# Patient Record
Sex: MALE | Race: WHITE | Employment: OTHER | URBAN - METROPOLITAN AREA
[De-identification: names, ages, dates, MRNs, and addresses within clinical notes are randomized per-mention and may not be internally consistent; named-entity substitution may affect disease eponyms.]

---

## 2016-09-02 LAB
ANION GAP SERPL CALCULATED.3IONS-SCNC: NORMAL MMOL/L
BUN SERPL-MCNC: 15 MG/DL
CALCIUM SERPL-MCNC: 9.9 MG/DL
CHLORIDE SERPLBLD-SCNC: 100 MMOL/L
CHOLEST SERPL-MCNC: 206 MG/DL
CO2 SERPL-SCNC: 24 MMOL/L
CREAT SERPL-MCNC: NORMAL MG/DL
GFR SERPL CREATININE-BSD FRML MDRD: >60 ML/MIN/1.73M2
GLUCOSE SERPL-MCNC: 86 MG/DL (ref 70–99)
HDLC SERPL-MCNC: 72 MG/DL
LDLC SERPL CALC-MCNC: 120 MG/DL
POTASSIUM SERPL-SCNC: 4 MMOL/L
SODIUM SERPL-SCNC: 135 MMOL/L
TRIGL SERPL-MCNC: 68 MG/DL

## 2017-03-09 DIAGNOSIS — I10 ESSENTIAL HYPERTENSION, BENIGN: ICD-10-CM

## 2017-03-09 RX ORDER — LOSARTAN POTASSIUM AND HYDROCHLOROTHIAZIDE 12.5; 1 MG/1; MG/1
1 TABLET ORAL DAILY
Qty: 90 TABLET | Refills: 2 | Status: SHIPPED | OUTPATIENT
Start: 2017-03-09 | End: 2018-02-27

## 2017-03-30 ENCOUNTER — DOCUMENTATION ONLY (OUTPATIENT)
Dept: CARDIOLOGY | Facility: CLINIC | Age: 67
End: 2017-03-30

## 2017-03-30 NOTE — LETTER
March 30, 2017       TO: Thomas Desir  [address]  [facility] [address]       Dear Thomas Desir,    We are reviewing our outstanding orders.    Dr. Shah has ordered labs and an office visit for follow up.      Please contact the scheduling desk at [phone] to arranged for an appointment.     If you have any questions, please call the Team 4 nurse phone @ [phone]. If you had your lab work done at another facility, please give us a call so we can locate the results.     Thank you  Team 4 nurses

## 2017-08-01 DIAGNOSIS — I10 HTN (HYPERTENSION): Primary | ICD-10-CM

## 2017-12-11 ENCOUNTER — TELEPHONE (OUTPATIENT)
Dept: CARDIOLOGY | Facility: CLINIC | Age: 67
End: 2017-12-11

## 2017-12-11 NOTE — TELEPHONE ENCOUNTER
Overdue for f/u OV with Dr. Garcia along with fasting Lipid profile. Left message for patient to call for appointment.

## 2018-02-08 ENCOUNTER — OFFICE VISIT (OUTPATIENT)
Dept: CARDIOLOGY | Facility: CLINIC | Age: 68
End: 2018-02-08
Payer: COMMERCIAL

## 2018-02-08 VITALS
HEART RATE: 60 BPM | BODY MASS INDEX: 25.93 KG/M2 | WEIGHT: 202 LBS | SYSTOLIC BLOOD PRESSURE: 132 MMHG | DIASTOLIC BLOOD PRESSURE: 80 MMHG | HEIGHT: 74 IN

## 2018-02-08 DIAGNOSIS — E78.2 MIXED HYPERLIPIDEMIA: ICD-10-CM

## 2018-02-08 DIAGNOSIS — I10 BENIGN ESSENTIAL HYPERTENSION: ICD-10-CM

## 2018-02-08 DIAGNOSIS — I25.10 CORONARY ARTERY DISEASE INVOLVING NATIVE CORONARY ARTERY OF NATIVE HEART WITHOUT ANGINA PECTORIS: ICD-10-CM

## 2018-02-08 LAB
CHOLEST SERPL-MCNC: 170 MG/DL
HDLC SERPL-MCNC: 67 MG/DL
LDLC SERPL CALC-MCNC: 82 MG/DL
NONHDLC SERPL-MCNC: 103 MG/DL
TRIGL SERPL-MCNC: 107 MG/DL

## 2018-02-08 PROCEDURE — 36415 COLL VENOUS BLD VENIPUNCTURE: CPT | Performed by: INTERNAL MEDICINE

## 2018-02-08 PROCEDURE — 80061 LIPID PANEL: CPT | Performed by: INTERNAL MEDICINE

## 2018-02-08 PROCEDURE — 99214 OFFICE O/P EST MOD 30 MIN: CPT | Performed by: INTERNAL MEDICINE

## 2018-02-08 NOTE — MR AVS SNAPSHOT
"              After Visit Summary   2/8/2018    Thomas Desir    MRN: [id number]           Patient Information     Date Of Birth          1950        Visit Information        Provider Department      2/8/2018 10:15 AM John Shah MD [facility]        Today's Diagnoses     Coronary artery disease involving native coronary artery of native heart without angina pectoris        Benign essential hypertension        Mixed hyperlipidemia           Follow-ups after your visit        Additional Services     Follow-Up with Cardiologist                 Future tests that were ordered for you today     Open Future Orders        Priority Expected Expires Ordered    Follow-Up with Cardiologist Routine 2/8/2019 2/9/2019 2/8/2018            Who to contact     If you have questions or need follow up information about today's clinic visit or your schedule please contact [facility] directly at [phone].  Normal or non-critical lab and imaging results will be communicated to you by [employer], letter or phone within 4 business days after the clinic has received the results. If you do not hear from us within 7 days, please contact the clinic through [employer] or phone. If you have a critical or abnormal lab result, we will notify you by phone as soon as possible.  Submit refill requests through [email or web address] or call your pharmacy and they will forward the refill request to us. Please allow 3 business days for your refill to be completed.          Additional Information About Your Visit        [employer] Information     [email or web address] lets you send messages to your doctor, view your test results, renew your prescriptions, schedule appointments and more. To sign up, go to [email or web address] . Click on \"Log in\" on the left side of the screen, which will take you to the Welcome page. Then click on \"Sign up Now\" on the right side of the page.     You will be " "asked to enter the access code listed below, as well as some personal information. Please follow the directions to create your username and password.     Your access code is: [email or web address]  Expires: 2018 10:51 AM     Your access code will  in 90 days. If you need help or a new code, please call your [place] clinic or [phone].        Care EveryWhere ID     This is your Care EveryWhere ID. This could be used by other organizations to access your [place] medical records  [id number]        Your Vitals Were     Pulse Height BMI (Body Mass Index)             60 1.88 m (6' 2\") 25.94 kg/m2          Blood Pressure from Last 3 Encounters:   18 132/80   16 122/70   09/03/15 130/78    Weight from Last 3 Encounters:   18 91.6 kg (202 lb)   16 89.8 kg (198 lb)   09/03/15 90.3 kg (199 lb)              We Performed the Following     Follow-Up with Cardiologist        Primary Care Provider Office Phone # Fax #    Alex Abraham [phone]       [facility] [address] [place] [address]        Equal Access to Services     PINKY REILLY : Hadii aad ku hadasho Soomaali, waaxda luqadaha, qaybta kaalmada adeegyada, waxay idiin hayangela petersen . So [facility] [phone].    ATENCIÓN: Si habla español, tiene a pittman disposición servicios gratuitos de asistencia lingüística. Llame al [phone].    We comply with applicable federal civil rights laws and [place] laws. We do not discriminate on the basis of race, color, national origin, age, disability, sex, sexual orientation, or gender identity.            Thank you!     Thank you for choosing [facility]  for your care. Our goal is always to provide you with excellent care. Hearing back from our patients is one way we can continue to improve our services. Please take a few minutes to complete the written survey that you may receive in the mail after your " visit with us. Thank you!             Your Updated Medication List - Protect others around you: Learn how to safely use, store and throw away your medicines at www.disposemymeds.org.          This list is accurate as of 2/8/18 10:51 AM.  Always use your most recent med list.                   Brand Name Dispense Instructions for use Diagnosis    aspirin 81 MG tablet      Take 162 mg by mouth daily        losartan-hydrochlorothiazide 100-12.5 MG per tablet    HYZAAR    90 tablet    Take 1 tablet by mouth daily    Essential hypertension, benign       rosuvastatin 40 MG tablet    CRESTOR    90 tablet    Take 1 tablet (40 mg) by mouth daily    Mixed hyperlipidemia

## 2018-02-08 NOTE — PROGRESS NOTES
HPI and Plan:   See dictation:[id number]    Orders Placed This Encounter   Procedures     Follow-Up with Cardiologist       No orders of the defined types were placed in this encounter.      There are no discontinued medications.      Encounter Diagnoses   Name Primary?     Coronary artery disease involving native coronary artery of native heart without angina pectoris      Benign essential hypertension      Mixed hyperlipidemia        CURRENT MEDICATIONS:  Current Outpatient Prescriptions   Medication Sig Dispense Refill     losartan-hydrochlorothiazide (HYZAAR) 100-12.5 MG per tablet Take 1 tablet by mouth daily 90 tablet 2     rosuvastatin (CRESTOR) 40 MG tablet Take 1 tablet (40 mg) by mouth daily 90 tablet 3     aspirin 81 MG tablet Take 162 mg by mouth daily         ALLERGIES   No Known Allergies    PAST MEDICAL HISTORY:  Past Medical History:   Diagnosis Date     CAD (coronary artery disease) 8/31/2015     Duodenal ulcer     age 20     ED (erectile dysfunction)      HTN (hypertension) 8/31/2015     Mixed hyperlipidemia 8/31/2015       PAST SURGICAL HISTORY:  Past Surgical History:   Procedure Laterality Date     HC LEFT HEART CATHETERIZATION  1995    PTCA to LAD 95% to residual of 40% lesion. No other significant disease.       FAMILY HISTORY:  Family History   Problem Relation Age of Onset     Coronary Artery Disease Father      Lymphoma Mother        SOCIAL HISTORY:  Social History     Social History     Marital status:      Spouse name: N/A     Number of children: N/A     Years of education: N/A     Social History Main Topics     Smoking status: Former Smoker     Smokeless tobacco: Former User      Comment: quit 1992     Alcohol use 0.0 oz/week     0 Standard drinks or equivalent per week      Comment: 2 drinks every evening     Drug use: No     Sexual activity: Not Asked     Other Topics Concern     Caffeine Concern No     3 cups a day     Exercise Yes     walking 3 x week, approx 1 mile     Seat Belt  "Yes     Social History Narrative       Review of Systems:  Skin:  Negative       Eyes:  Positive for glasses    ENT:  Negative      Respiratory:  Negative       Cardiovascular:  Negative      Gastroenterology: Negative      Genitourinary:  not assessed      Musculoskeletal:  Negative      Neurologic:  Negative      Psychiatric:  Negative      Heme/Lymph/Imm:  Negative      Endocrine:  Negative        Physical Exam:  Vitals: /80 (BP Location: Right arm, Patient Position: Sitting, Cuff Size: Adult Large)  Pulse 60  Ht 1.88 m (6' 2\")  Wt 91.6 kg (202 lb)  BMI 25.94 kg/m2    Constitutional:  cooperative, alert and oriented, well developed, well nourished, in no acute distress        Skin:  warm and dry to the touch, no apparent skin lesions or masses noted          Head:  normocephalic, no masses or lesions        Eyes:  pupils equal and round, conjunctivae and lids unremarkable, sclera white, no xanthalasma, EOMS intact, no nystagmus        Lymph:      ENT:  no pallor or cyanosis, dentition good        Neck:  carotid pulses are full and equal bilaterally, JVP normal, no carotid bruit        Respiratory:  normal breath sounds, clear to auscultation, normal A-P diameter, normal symmetry, normal respiratory excursion, no use of accessory muscles         Cardiac: regular rhythm, normal S1/S2, no S3 or S4, apical impulse not displaced, no murmurs, gallops or rubs                pulses full and equal, no bruits auscultated                                        GI:  abdomen soft, non-tender, BS normoactive, no mass, no HSM, no bruits        Extremities and Muscular Skeletal:  no deformities, clubbing, cyanosis, erythema observed;no edema              Neurological:  no gross motor deficits;affect appropriate        Psych:  Alert and Oriented x 3        CC  John Shah MD  [address]  [place] [address]              "

## 2018-02-08 NOTE — PROGRESS NOTES
Service Date: 02/08/2018      PRIMARY CARE PHYSICIAN:  Dr. Alex Abraham.      HISTORY OF PRESENT ILLNESS:  I again had the pleasure of seeing your patient, Thomas Desir, at [facility] for evaluation of coronary artery disease, hypertension and hyperlipidemia.  The patient is status post left anterior descending artery angioplasty in 1995.  He denies recurrent angina.  A stress echocardiogram 09/20/2013 was normal without evidence of cardiac ischemia.  The patient denies myalgias or myopathy.  He denies palpitations, syncope, presyncope, PND, orthopnea or peripheral edema.  He has known hyperlipidemia and elevated lipid protein (a).  A fasting lipid profile today includes total cholesterol 170, HDL 67, LDL 82 and triglycerides 107.  Of note, his LDL cholesterol in 09/2016 was 120 but he had run out of his statin shortly before that test.  He is exercising by walking only a half mile per day with his wife.  He is going to semi-retire in June of this year.  He has a [employer] business as well.  He remains on a relatively low fat diet.      PHYSICAL EXAMINATION:  As listed below.      ASSESSMENT:   1.  Thomas Desir is a delightful 67-year-old male with a history of coronary artery disease.  He is stable without recurrent angina.  We discussed repeating his stress echocardiogram this year and he prefers to wait and discuss this with me next year.  He remains symptom-free.   2.  Hyperlipidemia with high Lp(a).  His lipids are reasonably well controlled.  His LDL is still above 70 but his HDL is also quite high.  His LDL will come down further with attention to exercise.  We discussed this at length.      It is my pleasure to assist in the care of Thomas Desir.  I will see him again in 1 year with a fasting lipid profile.  A BMP will be performed with his primary care physician annually.  I reviewed this from 09/2016 with normal BUN and creatinine.  All the patient's questions today were  answered to his satisfaction.      Shirley Shah MD       cc:   Alex Abraham MD    [facility]    [address] [place]  [address]         SHIRLEY SHAH MD, [facility]             D: 2018   T: 2018   MT: MILO      Name:     DAVIE ZEE   MRN:      [id number]        Account:      [id number]   :      1950           Service Date: 2018      Document: [id number]

## 2018-02-08 NOTE — LETTER
2/8/2018    Alex Abraham MD  [facility] [address] [place] [address]    RE: Thomas Desir       Dear Colleague,    I had the pleasure of seeing Thomas Desir in the [facility].    HPI and Plan:   See dictation:[id number]    Orders Placed This Encounter   Procedures     Follow-Up with Cardiologist       No orders of the defined types were placed in this encounter.      There are no discontinued medications.      Encounter Diagnoses   Name Primary?     Coronary artery disease involving native coronary artery of native heart without angina pectoris      Benign essential hypertension      Mixed hyperlipidemia        CURRENT MEDICATIONS:  Current Outpatient Prescriptions   Medication Sig Dispense Refill     losartan-hydrochlorothiazide (HYZAAR) 100-12.5 MG per tablet Take 1 tablet by mouth daily 90 tablet 2     rosuvastatin (CRESTOR) 40 MG tablet Take 1 tablet (40 mg) by mouth daily 90 tablet 3     aspirin 81 MG tablet Take 162 mg by mouth daily         ALLERGIES   No Known Allergies    PAST MEDICAL HISTORY:  Past Medical History:   Diagnosis Date     CAD (coronary artery disease) 8/31/2015     Duodenal ulcer     age 20     ED (erectile dysfunction)      HTN (hypertension) 8/31/2015     Mixed hyperlipidemia 8/31/2015       PAST SURGICAL HISTORY:  Past Surgical History:   Procedure Laterality Date     HC LEFT HEART CATHETERIZATION  1995    PTCA to LAD 95% to residual of 40% lesion. No other significant disease.       FAMILY HISTORY:  Family History   Problem Relation Age of Onset     Coronary Artery Disease Father      Lymphoma Mother        SOCIAL HISTORY:  Social History     Social History     Marital status:      Spouse name: N/A     Number of children: N/A     Years of education: N/A     Social History Main Topics     Smoking status: Former Smoker     Smokeless tobacco: Former User      Comment: quit 1992     Alcohol use 0.0 oz/week     0  "Standard drinks or equivalent per week      Comment: 2 drinks every evening     Drug use: No     Sexual activity: Not Asked     Other Topics Concern     Caffeine Concern No     3 cups a day     Exercise Yes     walking 3 x week, approx 1 mile     Seat Belt Yes     Social History Narrative       Review of Systems:  Skin:  Negative       Eyes:  Positive for glasses    ENT:  Negative      Respiratory:  Negative       Cardiovascular:  Negative      Gastroenterology: Negative      Genitourinary:  not assessed      Musculoskeletal:  Negative      Neurologic:  Negative      Psychiatric:  Negative      Heme/Lymph/Imm:  Negative      Endocrine:  Negative        Physical Exam:  Vitals: /80 (BP Location: Right arm, Patient Position: Sitting, Cuff Size: Adult Large)  Pulse 60  Ht 1.88 m (6' 2\")  Wt 91.6 kg (202 lb)  BMI 25.94 kg/m2    Constitutional:  cooperative, alert and oriented, well developed, well nourished, in no acute distress        Skin:  warm and dry to the touch, no apparent skin lesions or masses noted          Head:  normocephalic, no masses or lesions        Eyes:  pupils equal and round, conjunctivae and lids unremarkable, sclera white, no xanthalasma, EOMS intact, no nystagmus        Lymph:      ENT:  no pallor or cyanosis, dentition good        Neck:  carotid pulses are full and equal bilaterally, JVP normal, no carotid bruit        Respiratory:  normal breath sounds, clear to auscultation, normal A-P diameter, normal symmetry, normal respiratory excursion, no use of accessory muscles         Cardiac: regular rhythm, normal S1/S2, no S3 or S4, apical impulse not displaced, no murmurs, gallops or rubs                pulses full and equal, no bruits auscultated                                        GI:  abdomen soft, non-tender, BS normoactive, no mass, no HSM, no bruits        Extremities and Muscular Skeletal:  no deformities, clubbing, cyanosis, erythema observed;no edema          "     Neurological:  no gross motor deficits;affect appropriate        Psych:  Alert and Oriented x 3        CC  John Shah MD  [address]  [place] [address]                Thank you for allowing me to participate in the care of your patient.      Sincerely,     John Shah MD     [facility]    cc:   John Shah MD  [address]  [place] [address]

## 2018-02-08 NOTE — LETTER
2/8/2018      Alex Abraham MD  [facility] [address] [place] [address]      RE: Thomas Desir       Dear Colleague,    I had the pleasure of seeing Thomas Desir in the [facility].    Service Date: 02/08/2018      PRIMARY CARE PHYSICIAN:  Dr. Alex Abraham.      HISTORY OF PRESENT ILLNESS:  I again had the pleasure of seeing your patient, Thomas Desir, at [facility] for evaluation of coronary artery disease, hypertension and hyperlipidemia.  The patient is status post left anterior descending artery angioplasty in 1995.  He denies recurrent angina.  A stress echocardiogram 09/20/2013 was normal without evidence of cardiac ischemia.  The patient denies myalgias or myopathy.  He denies palpitations, syncope, presyncope, PND, orthopnea or peripheral edema.  He has known hyperlipidemia and elevated lipid protein (a).  A fasting lipid profile today includes total cholesterol 170, HDL 67, LDL 82 and triglycerides 107.  Of note, his LDL cholesterol in 09/2016 was 120 but he had run out of his statin shortly before that test.  He is exercising by walking only a half mile per day with his wife.  He is going to semi-retire in June of this year.  He has a [employer] business as well.  He remains on a relatively low fat diet.      PHYSICAL EXAMINATION:  As listed below.      ASSESSMENT:   1.  Thomas Desir is a delightful 67-year-old male with a history of coronary artery disease.  He is stable without recurrent angina.  We discussed repeating his stress echocardiogram this year and he prefers to wait and discuss this with me next year.  He remains symptom-free.   2.  Hyperlipidemia with high Lp(a).  His lipids are reasonably well controlled.  His LDL is still above 70 but his HDL is also quite high.  His LDL will come down further with attention to exercise.  We discussed this at length.      It is my pleasure to assist in the care  of Davie Desir.  I will see him again in 1 year with a fasting lipid profile.  A BMP will be performed with his primary care physician annually.  I reviewed this from 2016 with normal BUN and creatinine.  All the patient's questions today were answered to his satisfaction.      Shirley Hein MD       cc:   Alex Abraham MD    [facility]    [address] [place]  [address]         SHIRLEY HEIN MD, [facility]             D: 2018   T: 2018   MT: MILO      Name:     DAVIE DESIR   MRN:      [id number]        Account:      [id number]   :      1950           Service Date: 2018      Document: [id number]           Outpatient Encounter Prescriptions as of 2018   Medication Sig Dispense Refill     losartan-hydrochlorothiazide (HYZAAR) 100-12.5 MG per tablet Take 1 tablet by mouth daily 90 tablet 2     rosuvastatin (CRESTOR) 40 MG tablet Take 1 tablet (40 mg) by mouth daily 90 tablet 3     aspirin 81 MG tablet Take 162 mg by mouth daily       No facility-administered encounter medications on file as of 2018.        Again, thank you for allowing me to participate in the care of your patient.      Sincerely,    Shirley Hein MD     [facility]

## 2018-02-16 DIAGNOSIS — E78.2 MIXED HYPERLIPIDEMIA: ICD-10-CM

## 2018-02-16 DIAGNOSIS — I10 ESSENTIAL HYPERTENSION, BENIGN: ICD-10-CM

## 2018-02-16 RX ORDER — ROSUVASTATIN CALCIUM 40 MG/1
40 TABLET, COATED ORAL DAILY
Qty: 90 TABLET | Refills: 3 | Status: SHIPPED | OUTPATIENT
Start: 2018-02-16 | End: 2019-06-17

## 2018-02-27 RX ORDER — LOSARTAN POTASSIUM AND HYDROCHLOROTHIAZIDE 12.5; 1 MG/1; MG/1
1 TABLET ORAL DAILY
Qty: 90 TABLET | Refills: 3 | Status: SHIPPED | OUTPATIENT
Start: 2018-02-27 | End: 2019-05-30

## 2019-04-25 LAB
ANION GAP SERPL CALCULATED.3IONS-SCNC: NORMAL MMOL/L
BUN SERPL-MCNC: 13 MG/DL
CALCIUM SERPL-MCNC: 9.5 MG/DL
CHLORIDE SERPLBLD-SCNC: 107 MMOL/L
CHOLEST SERPL-MCNC: 187 MG/DL
CO2 SERPL-SCNC: 24 MMOL/L
CREAT SERPL-MCNC: 0.78 MG/DL
GFR SERPL CREATININE-BSD FRML MDRD: >60 ML/MIN/1.73M2
GLUCOSE SERPL-MCNC: 94 MG/DL (ref 70–100)
HDLC SERPL-MCNC: 66 MG/DL
LDLC SERPL CALC-MCNC: 107 MG/DL
NONHDLC SERPL-MCNC: NORMAL MG/DL
POTASSIUM SERPL-SCNC: 4.2 MMOL/L
SODIUM SERPL-SCNC: 140 MMOL/L
TRIGL SERPL-MCNC: 70 MG/DL

## 2019-05-30 DIAGNOSIS — I10 ESSENTIAL HYPERTENSION, BENIGN: ICD-10-CM

## 2019-05-30 RX ORDER — LOSARTAN POTASSIUM AND HYDROCHLOROTHIAZIDE 12.5; 1 MG/1; MG/1
1 TABLET ORAL DAILY
Qty: 90 TABLET | Refills: 0 | Status: SHIPPED | OUTPATIENT
Start: 2019-05-30 | End: 2019-08-20

## 2019-06-17 DIAGNOSIS — E78.2 MIXED HYPERLIPIDEMIA: ICD-10-CM

## 2019-06-17 RX ORDER — ROSUVASTATIN CALCIUM 40 MG/1
40 TABLET, COATED ORAL DAILY
Qty: 90 TABLET | Refills: 0 | Status: SHIPPED | OUTPATIENT
Start: 2019-06-17

## 2019-08-20 DIAGNOSIS — I10 ESSENTIAL HYPERTENSION, BENIGN: ICD-10-CM

## 2019-08-20 RX ORDER — LOSARTAN POTASSIUM AND HYDROCHLOROTHIAZIDE 12.5; 1 MG/1; MG/1
1 TABLET ORAL DAILY
Qty: 90 TABLET | Refills: 0 | Status: SHIPPED | OUTPATIENT
Start: 2019-08-20 | End: 2019-12-27

## 2019-08-20 NOTE — TELEPHONE ENCOUNTER
Refill request received for losartan-hydrochlorothiazide. Pt overdue for follow up with Dr. Shah. Tried to call patient to get him scheduled for OV. No answer. Left VM informing patient to call scheduling to schedule OV with Dr. Shah. Scheduling number left for patient. 90 day supply sent in to [facility] with instructions to call clinic for OV.

## 2019-12-27 DIAGNOSIS — I10 ESSENTIAL HYPERTENSION, BENIGN: ICD-10-CM

## 2019-12-27 RX ORDER — LOSARTAN POTASSIUM AND HYDROCHLOROTHIAZIDE 12.5; 1 MG/1; MG/1
1 TABLET ORAL DAILY
Qty: 90 TABLET | Refills: 0 | Status: SHIPPED | OUTPATIENT
Start: 2019-12-27 | End: 2020-03-23

## 2020-03-23 DIAGNOSIS — I10 ESSENTIAL HYPERTENSION, BENIGN: ICD-10-CM

## 2020-03-23 RX ORDER — LOSARTAN POTASSIUM AND HYDROCHLOROTHIAZIDE 12.5; 1 MG/1; MG/1
1 TABLET ORAL DAILY
Qty: 90 TABLET | Refills: 0 | Status: SHIPPED | OUTPATIENT
Start: 2020-03-23 | End: 2020-12-15

## 2020-12-01 ENCOUNTER — TELEPHONE (OUTPATIENT)
Dept: CARDIOLOGY | Facility: CLINIC | Age: 70
End: 2020-12-01

## 2020-12-01 NOTE — TELEPHONE ENCOUNTER
Fax sent to [facility] to deny refill. Patient no longer following with our clinic.

## 2020-12-15 DIAGNOSIS — I10 ESSENTIAL HYPERTENSION, BENIGN: ICD-10-CM

## 2020-12-15 RX ORDER — LOSARTAN POTASSIUM AND HYDROCHLOROTHIAZIDE 12.5; 1 MG/1; MG/1
1 TABLET ORAL DAILY
Qty: 90 TABLET | Refills: 0 | Status: SHIPPED | OUTPATIENT
Start: 2020-12-15 | End: 2021-03-29

## 2021-01-14 ENCOUNTER — PRE VISIT (OUTPATIENT)
Dept: CARDIOLOGY | Facility: CLINIC | Age: 71
End: 2021-01-14

## 2021-01-14 DIAGNOSIS — I10 HTN (HYPERTENSION): ICD-10-CM

## 2021-01-14 DIAGNOSIS — E78.2 MIXED HYPERLIPIDEMIA: Primary | ICD-10-CM

## 2021-01-14 NOTE — TELEPHONE ENCOUNTER
Faxed request for ekg strip 4/6/2020 from [facility].      1/15/2021 attempted to contact patient to discuss lab appointment for OV 2/2/21. Left message for patient to call back.

## 2021-01-14 NOTE — TELEPHONE ENCOUNTER
Patient called, message left that Labs would like to be drawn prior to Dr. Castro's visit 2-2-21.   Orders entered and message left to please call back.     PCP @ [facility]. Appears last labs were   2019.   EKG tracing from April 2020 to be requested.

## 2021-01-28 DIAGNOSIS — I10 HTN (HYPERTENSION): ICD-10-CM

## 2021-01-28 DIAGNOSIS — E78.2 MIXED HYPERLIPIDEMIA: ICD-10-CM

## 2021-01-28 LAB
ALT SERPL W P-5'-P-CCNC: 30 U/L (ref 0–70)
ANION GAP SERPL CALCULATED.3IONS-SCNC: 5 MMOL/L (ref 3–14)
BUN SERPL-MCNC: 11 MG/DL (ref 7–30)
CALCIUM SERPL-MCNC: 9.2 MG/DL (ref 8.5–10.1)
CHLORIDE SERPL-SCNC: 108 MMOL/L (ref 94–109)
CHOLEST SERPL-MCNC: 147 MG/DL
CO2 SERPL-SCNC: 27 MMOL/L (ref 20–32)
CREAT SERPL-MCNC: 0.8 MG/DL (ref 0.66–1.25)
GFR SERPL CREATININE-BSD FRML MDRD: >90 ML/MIN/{1.73_M2}
GLUCOSE SERPL-MCNC: 91 MG/DL (ref 70–99)
HDLC SERPL-MCNC: 74 MG/DL
LDLC SERPL CALC-MCNC: 62 MG/DL
NONHDLC SERPL-MCNC: 73 MG/DL
POTASSIUM SERPL-SCNC: 4.5 MMOL/L (ref 3.4–5.3)
SODIUM SERPL-SCNC: 140 MMOL/L (ref 133–144)
TRIGL SERPL-MCNC: 56 MG/DL

## 2021-01-28 PROCEDURE — 36415 COLL VENOUS BLD VENIPUNCTURE: CPT | Performed by: INTERNAL MEDICINE

## 2021-01-28 PROCEDURE — 84460 ALANINE AMINO (ALT) (SGPT): CPT | Performed by: INTERNAL MEDICINE

## 2021-01-28 PROCEDURE — 80061 LIPID PANEL: CPT | Performed by: INTERNAL MEDICINE

## 2021-01-28 PROCEDURE — 80048 BASIC METABOLIC PNL TOTAL CA: CPT | Performed by: INTERNAL MEDICINE

## 2021-02-02 ENCOUNTER — OFFICE VISIT (OUTPATIENT)
Dept: CARDIOLOGY | Facility: CLINIC | Age: 71
End: 2021-02-02
Payer: COMMERCIAL

## 2021-02-02 VITALS
SYSTOLIC BLOOD PRESSURE: 122 MMHG | HEIGHT: 74 IN | DIASTOLIC BLOOD PRESSURE: 70 MMHG | WEIGHT: 194.2 LBS | BODY MASS INDEX: 24.92 KG/M2 | HEART RATE: 76 BPM | OXYGEN SATURATION: 96 %

## 2021-02-02 DIAGNOSIS — Z15.89 BIALLELIC MUTATION OF LPA GENE: ICD-10-CM

## 2021-02-02 DIAGNOSIS — I25.10 CORONARY ARTERY DISEASE INVOLVING NATIVE CORONARY ARTERY OF NATIVE HEART WITHOUT ANGINA PECTORIS: Primary | ICD-10-CM

## 2021-02-02 DIAGNOSIS — I10 BENIGN ESSENTIAL HYPERTENSION: ICD-10-CM

## 2021-02-02 DIAGNOSIS — E78.2 MIXED HYPERLIPIDEMIA: ICD-10-CM

## 2021-02-02 PROCEDURE — 99214 OFFICE O/P EST MOD 30 MIN: CPT | Performed by: INTERNAL MEDICINE

## 2021-02-02 ASSESSMENT — MIFFLIN-ST. JEOR: SCORE: 1710.64

## 2021-02-02 NOTE — LETTER
2/2/2021    Alex Abraham MD  [facility] [address] [place] [address]    RE: Thomas Desir       Dear Colleague,    I had the pleasure of seeing Thomas Desir in the [facility].    HPI and Plan:   See dictation    Orders Placed This Encounter   Procedures     Basic metabolic panel     Lipid Profile     Follow-Up with Cardiologist       No orders of the defined types were placed in this encounter.      There are no discontinued medications.      Encounter Diagnoses   Name Primary?     Coronary artery disease involving native coronary artery of native heart without angina pectoris Yes     Benign essential hypertension      Mixed hyperlipidemia      Biallelic mutation of LPA gene        CURRENT MEDICATIONS:  Current Outpatient Medications   Medication Sig Dispense Refill     aspirin 81 MG tablet Take 162 mg by mouth daily       losartan-hydrochlorothiazide (HYZAAR) 100-12.5 MG tablet Take 1 tablet by mouth daily 90 tablet 0     rosuvastatin (CRESTOR) 40 MG tablet Take 1 tablet (40 mg) by mouth daily 90 tablet 0       ALLERGIES   No Known Allergies    PAST MEDICAL HISTORY:  Past Medical History:   Diagnosis Date     CAD (coronary artery disease) 8/31/2015     Duodenal ulcer     age 20     ED (erectile dysfunction)      HTN (hypertension) 8/31/2015     Mixed hyperlipidemia 8/31/2015       PAST SURGICAL HISTORY:  Past Surgical History:   Procedure Laterality Date     HC LEFT HEART CATHETERIZATION  1995    PTCA to LAD 95% to residual of 40% lesion. No other significant disease.       FAMILY HISTORY:  Family History   Problem Relation Age of Onset     Coronary Artery Disease Father      Lymphoma Mother        SOCIAL HISTORY:  Social History     Socioeconomic History     Marital status:      Spouse name: None     Number of children: None     Years of education: None     Highest education level: None   Occupational History     None   Social Needs      Financial resource strain: None     Food insecurity     Worry: None     Inability: None     Transportation needs     Medical: None     Non-medical: None   Tobacco Use     Smoking status: Former Smoker     Packs/day: 1.50     Years: 20.00     Pack years: 30.00     Types: Cigarettes     Start date:      Quit date:      Years since quittin.1     Smokeless tobacco: Former User   Substance and Sexual Activity     Alcohol use: Yes     Alcohol/week: 0.0 standard drinks     Comment: 2 drinks every evening     Drug use: No     Sexual activity: None   Lifestyle     Physical activity     Days per week: None     Minutes per session: None     Stress: None   Relationships     Social connections     Talks on phone: None     Gets together: None     Attends Spiritism service: None     Active member of club or organization: None     Attends meetings of clubs or organizations: None     Relationship status: None     Intimate partner violence     Fear of current or ex partner: None     Emotionally abused: None     Physically abused: None     Forced sexual activity: None   Other Topics Concern     Parent/sibling w/ CABG, MI or angioplasty before 65F 55M? Not Asked      Service Not Asked     Blood Transfusions Not Asked     Caffeine Concern No     Comment: 3 cups a day     Occupational Exposure Not Asked     Hobby Hazards Not Asked     Sleep Concern Not Asked     Stress Concern Not Asked     Weight Concern Not Asked     Special Diet Not Asked     Back Care Not Asked     Exercise Yes     Comment: walking 3 x week, approx 1 mile     Bike Helmet Not Asked     Seat Belt Yes     Self-Exams Not Asked   Social History Narrative     None       Review of Systems:  Skin:  Negative       Eyes:  Positive for glasses reading  ENT:  Positive for hearing loss loss  Respiratory:  Negative       Cardiovascular:  Negative      Gastroenterology: Negative      Genitourinary:  Negative      Musculoskeletal:  Negative      Neurologic:   "Negative      Psychiatric:  Negative      Heme/Lymph/Imm:  Negative      Endocrine:  Negative        Physical Exam:  Vitals: /70   Pulse 76   Ht 1.88 m (6' 2\")   Wt 88.1 kg (194 lb 3.2 oz)   SpO2 96%   BMI 24.93 kg/m      Constitutional:  cooperative, alert and oriented, well developed, well nourished, in no acute distress        Skin:  warm and dry to the touch, no apparent skin lesions or masses noted          Head:  normocephalic, no masses or lesions        Eyes:  pupils equal and round, conjunctivae and lids unremarkable, sclera white, no xanthalasma, EOMS intact, no nystagmus        Lymph:      ENT:  no pallor or cyanosis, dentition good        Neck:  carotid pulses are full and equal bilaterally;no carotid bruit        Respiratory:  normal breath sounds, clear to auscultation, normal A-P diameter, normal symmetry, normal respiratory excursion, no use of accessory muscles         Cardiac: regular rhythm;normal S1 and S2;no murmurs, gallops or rubs detected                pulses full and equal, no bruits auscultated                                        GI:           Extremities and Muscular Skeletal:  no deformities, clubbing, cyanosis, erythema observed;no edema              Neurological:  no gross motor deficits        Psych:  affect appropriate, oriented to time, person and place        CC  No referring provider defined for this encounter.                  Thank you for allowing me to participate in the care of your patient.      Sincerely,     Alex Castro MD     [facility]    cc:   No referring provider defined for this encounter.        "

## 2021-02-02 NOTE — LETTER
2/2/2021      Alex Abraham MD  [facility] [address] [place] [address]      RE: Thomas Desir       Dear Colleague,    I had the pleasure of seeing Thomas Chencyndimini in the [facility].    Service Date: 02/02/2021      Thomas is a very nice 70-year-old gentleman who I actually met 25 years ago when he presented with unstable angina and I performed angioplasty only in 1995 of his left anterior descending artery.  He has done very well since that time, following with my partner, Dr. John Shah.  Looking through the chart, last stress test was a stress echo in 2013 that was completely normal.  He has elevated Lp(a), for which Thomas has been very aggressive with his cholesterol management.  He also has hypertension on losartan/hydrochlorothiazide.      Thomas retired last year, so he thinks he is exercising more.  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  Over the years, he has evolved from a meat and potatoes diet to become more of a plant-based diet.  He has read the Blue Zones.  His children are also foodies and some are vegan.      He quit smoking at the time of his unstable angina.  He has no formal exercise regimen but states he does walk quite regularly with his wife.  He does play golf.  He used to play basketball and occasionally plays tennis.  None of these things cause him any problems.      ASSESSMENT AND PLAN:  Thomas and I had a long, very rich conversation regarding many topics.  We talked about the fact that in 1995 he got angioplasty and not a stent.  We talked about the hiatus between 1994 and later in 1995 when we had not figured out stents yet and that concern for a 10% stent thrombosis rate had us move away from stents until we figured out high pressure inflations and dual antiplatelet therapy.  We also talked about Lp(a), fasting lipid profile, the fact  that he does have a high HDL and treatment.  We talked about various studies including PROVE-IT, IMPROVE-IT, FOURIER and ODYSSEY.  We also talked about the benefits of not smoking, predominantly plant-based diet, regular exercise regimen and maintaining ideal body weight.  We also talked about the importance of regular exercise versus actually being fit.      At this time, Davie is having no anginal symptoms.  We talked about whether to run a stress test.  At this time, he is not interested as he states, even when he does his high level of activity, he has absolutely no symptoms.      He has no symptoms to suggest heart failure or significant arrhythmia.      Blood pressures in the ideal range, and we did not talk about the SPRINT trial but will talk about this next time when he comes in.  Blood pressure is 122/70 with a pulse of 76.      Weight is 194 pounds, giving him a body mass index of 24.9 with clothes on, and this is down 8 pounds from his last visit with Alex 2 years ago.      Fasting lipid profile is the best I have recorded with a total cholesterol 147, HDL of 74, LDL of 62, triglycerides of 56.  We talked about the fact that this is the highest HDL he has ever had and the lowest LDL he has ever had, a combination of his regular exercise, weight loss.  We talked about adding Zetia and the IMPROVE-IT trial, and at this time, we decided we will go with continued lifestyle, will watch this with keeping the study in the back of our mind.  Obviously, we do not need talk about PCSK9 inhibitors at this time.      Electrolytes are normal with a creatinine of 0.8, a potassium of 4.5.  I would continue all of his medications as is.      Almost 40 minutes was spent today discussing all these issues with Davie.         SARAH MANCILLA MD, [facility]             D: 2021   T: 2021   MT: valdo      Name:     DAVIE ZEE   MRN:      [id number]        Account:      [id number]   :      1950            Service Date: 02/02/2021      Document: [id number]        Outpatient Encounter Medications as of 2/2/2021   Medication Sig Dispense Refill     aspirin 81 MG tablet Take 162 mg by mouth daily       losartan-hydrochlorothiazide (HYZAAR) 100-12.5 MG tablet Take 1 tablet by mouth daily 90 tablet 0     rosuvastatin (CRESTOR) 40 MG tablet Take 1 tablet (40 mg) by mouth daily 90 tablet 0     No facility-administered encounter medications on file as of 2/2/2021.        Again, thank you for allowing me to participate in the care of your patient.      Sincerely,    Alex Castro MD     [facility]

## 2021-02-02 NOTE — PROGRESS NOTES
Service Date: 02/02/2021      Thomas is a very nice 70-year-old gentleman who I actually met 25 years ago when he presented with unstable angina and I performed angioplasty only in 1995 of his left anterior descending artery.  He has done very well since that time, following with my partner, Dr. John Shah.  Looking through the chart, last stress test was a stress echo in 2013 that was completely normal.  He has elevated Lp(a), for which Thomas has been very aggressive with his cholesterol management.  He also has hypertension on losartan/hydrochlorothiazide.      Thomas retired last year, so he thinks he is exercising more.  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  Over the years, he has evolved from a meat and potatoes diet to become more of a plant-based diet.  He has read the Blue Zones.  His children are also foodies and some are vegan.      He quit smoking at the time of his unstable angina.  He has no formal exercise regimen but states he does walk quite regularly with his wife.  He does play golf.  He used to play basketball and occasionally plays tennis.  None of these things cause him any problems.      ASSESSMENT AND PLAN:  Thomas and I had a long, very rich conversation regarding many topics.  We talked about the fact that in 1995 he got angioplasty and not a stent.  We talked about the hiatus between 1994 and later in 1995 when we had not figured out stents yet and that concern for a 10% stent thrombosis rate had us move away from stents until we figured out high pressure inflations and dual antiplatelet therapy.  We also talked about Lp(a), fasting lipid profile, the fact that he does have a high HDL and treatment.  We talked about various studies including PROVE-IT, IMPROVE-IT, FOURIER and ODYSSEY.  We also talked about the benefits of not smoking, predominantly plant-based diet, regular exercise regimen and  maintaining ideal body weight.  We also talked about the importance of regular exercise versus actually being fit.      At this time, Davie is having no anginal symptoms.  We talked about whether to run a stress test.  At this time, he is not interested as he states, even when he does his high level of activity, he has absolutely no symptoms.      He has no symptoms to suggest heart failure or significant arrhythmia.      Blood pressures in the ideal range, and we did not talk about the SPRINT trial but will talk about this next time when he comes in.  Blood pressure is 122/70 with a pulse of 76.      Weight is 194 pounds, giving him a body mass index of 24.9 with clothes on, and this is down 8 pounds from his last visit with Alex 2 years ago.      Fasting lipid profile is the best I have recorded with a total cholesterol 147, HDL of 74, LDL of 62, triglycerides of 56.  We talked about the fact that this is the highest HDL he has ever had and the lowest LDL he has ever had, a combination of his regular exercise, weight loss.  We talked about adding Zetia and the IMPROVE-IT trial, and at this time, we decided we will go with continued lifestyle, will watch this with keeping the study in the back of our mind.  Obviously, we do not need talk about PCSK9 inhibitors at this time.      Electrolytes are normal with a creatinine of 0.8, a potassium of 4.5.  I would continue all of his medications as is.      Almost 40 minutes was spent today discussing all these issues with Davie.         SARAH MANCILLA MD, [facility]             D: 2021   T: 2021   MT: valdo      Name:     DAVIE ZEE   MRN:      [id number]        Account:      [id number]   :      1950           Service Date: 2021      Document: [id number]

## 2021-02-02 NOTE — PROGRESS NOTES
HPI and Plan:   See dictation    Orders Placed This Encounter   Procedures     Basic metabolic panel     Lipid Profile     Follow-Up with Cardiologist       No orders of the defined types were placed in this encounter.      There are no discontinued medications.      Encounter Diagnoses   Name Primary?     Coronary artery disease involving native coronary artery of native heart without angina pectoris Yes     Benign essential hypertension      Mixed hyperlipidemia      Biallelic mutation of LPA gene        CURRENT MEDICATIONS:  Current Outpatient Medications   Medication Sig Dispense Refill     aspirin 81 MG tablet Take 162 mg by mouth daily       losartan-hydrochlorothiazide (HYZAAR) 100-12.5 MG tablet Take 1 tablet by mouth daily 90 tablet 0     rosuvastatin (CRESTOR) 40 MG tablet Take 1 tablet (40 mg) by mouth daily 90 tablet 0       ALLERGIES   No Known Allergies    PAST MEDICAL HISTORY:  Past Medical History:   Diagnosis Date     CAD (coronary artery disease) 8/31/2015     Duodenal ulcer     age 20     ED (erectile dysfunction)      HTN (hypertension) 8/31/2015     Mixed hyperlipidemia 8/31/2015       PAST SURGICAL HISTORY:  Past Surgical History:   Procedure Laterality Date     HC LEFT HEART CATHETERIZATION  1995    PTCA to LAD 95% to residual of 40% lesion. No other significant disease.       FAMILY HISTORY:  Family History   Problem Relation Age of Onset     Coronary Artery Disease Father      Lymphoma Mother        SOCIAL HISTORY:  Social History     Socioeconomic History     Marital status:      Spouse name: None     Number of children: None     Years of education: None     Highest education level: None   Occupational History     None   Social Needs     Financial resource strain: None     Food insecurity     Worry: None     Inability: None     Transportation needs     Medical: None     Non-medical: None   Tobacco Use     Smoking status: Former Smoker     Packs/day: 1.50     Years: 20.00     Pack  "years: 30.00     Types: Cigarettes     Start date:      Quit date:      Years since quittin.1     Smokeless tobacco: Former User   Substance and Sexual Activity     Alcohol use: Yes     Alcohol/week: 0.0 standard drinks     Comment: 2 drinks every evening     Drug use: No     Sexual activity: None   Lifestyle     Physical activity     Days per week: None     Minutes per session: None     Stress: None   Relationships     Social connections     Talks on phone: None     Gets together: None     Attends Anabaptism service: None     Active member of club or organization: None     Attends meetings of clubs or organizations: None     Relationship status: None     Intimate partner violence     Fear of current or ex partner: None     Emotionally abused: None     Physically abused: None     Forced sexual activity: None   Other Topics Concern     Parent/sibling w/ CABG, MI or angioplasty before 65F 55M? Not Asked      Service Not Asked     Blood Transfusions Not Asked     Caffeine Concern No     Comment: 3 cups a day     Occupational Exposure Not Asked     Hobby Hazards Not Asked     Sleep Concern Not Asked     Stress Concern Not Asked     Weight Concern Not Asked     Special Diet Not Asked     Back Care Not Asked     Exercise Yes     Comment: walking 3 x week, approx 1 mile     Bike Helmet Not Asked     Seat Belt Yes     Self-Exams Not Asked   Social History Narrative     None       Review of Systems:  Skin:  Negative       Eyes:  Positive for glasses reading  ENT:  Positive for hearing loss loss  Respiratory:  Negative       Cardiovascular:  Negative      Gastroenterology: Negative      Genitourinary:  Negative      Musculoskeletal:  Negative      Neurologic:  Negative      Psychiatric:  Negative      Heme/Lymph/Imm:  Negative      Endocrine:  Negative        Physical Exam:  Vitals: /70   Pulse 76   Ht 1.88 m (6' 2\")   Wt 88.1 kg (194 lb 3.2 oz)   SpO2 96%   BMI 24.93 kg/m      Constitutional:  " cooperative, alert and oriented, well developed, well nourished, in no acute distress        Skin:  warm and dry to the touch, no apparent skin lesions or masses noted          Head:  normocephalic, no masses or lesions        Eyes:  pupils equal and round, conjunctivae and lids unremarkable, sclera white, no xanthalasma, EOMS intact, no nystagmus        Lymph:      ENT:  no pallor or cyanosis, dentition good        Neck:  carotid pulses are full and equal bilaterally;no carotid bruit        Respiratory:  normal breath sounds, clear to auscultation, normal A-P diameter, normal symmetry, normal respiratory excursion, no use of accessory muscles         Cardiac: regular rhythm;normal S1 and S2;no murmurs, gallops or rubs detected                pulses full and equal, no bruits auscultated                                        GI:           Extremities and Muscular Skeletal:  no deformities, clubbing, cyanosis, erythema observed;no edema              Neurological:  no gross motor deficits        Psych:  affect appropriate, oriented to time, person and place        CC  No referring provider defined for this encounter.

## 2021-03-29 DIAGNOSIS — I10 ESSENTIAL HYPERTENSION, BENIGN: ICD-10-CM

## 2021-03-29 RX ORDER — LOSARTAN POTASSIUM AND HYDROCHLOROTHIAZIDE 12.5; 1 MG/1; MG/1
1 TABLET ORAL DAILY
Qty: 90 TABLET | Refills: 3 | Status: SHIPPED | OUTPATIENT
Start: 2021-03-29